# Patient Record
Sex: FEMALE | Race: WHITE | NOT HISPANIC OR LATINO | Employment: OTHER | URBAN - METROPOLITAN AREA
[De-identification: names, ages, dates, MRNs, and addresses within clinical notes are randomized per-mention and may not be internally consistent; named-entity substitution may affect disease eponyms.]

---

## 2018-04-02 ENCOUNTER — OFFICE VISIT (OUTPATIENT)
Dept: URGENT CARE | Facility: MEDICAL CENTER | Age: 28
End: 2018-04-02
Payer: COMMERCIAL

## 2018-04-02 VITALS
BODY MASS INDEX: 23.95 KG/M2 | RESPIRATION RATE: 16 BRPM | WEIGHT: 158 LBS | HEIGHT: 68 IN | DIASTOLIC BLOOD PRESSURE: 83 MMHG | SYSTOLIC BLOOD PRESSURE: 126 MMHG | TEMPERATURE: 99 F | HEART RATE: 79 BPM

## 2018-04-02 DIAGNOSIS — K08.89 PAIN, DENTAL: Primary | ICD-10-CM

## 2018-04-02 PROCEDURE — 99203 OFFICE O/P NEW LOW 30 MIN: CPT | Performed by: PHYSICIAN ASSISTANT

## 2018-04-02 RX ORDER — CLINDAMYCIN HYDROCHLORIDE 300 MG/1
300 CAPSULE ORAL 3 TIMES DAILY
Qty: 21 CAPSULE | Refills: 0 | Status: SHIPPED | OUTPATIENT
Start: 2018-04-02 | End: 2018-04-09

## 2018-04-03 NOTE — PATIENT INSTRUCTIONS
Take antibiotic as directed  Eat yogurt to avoid GI upset  Take motrin as directed for pain  Warm compress as directed  Pt has appt with dentist in one week  Dental Abscess   WHAT YOU NEED TO KNOW:   A dental abscess is a collection of pus in or around a tooth  A dental abscess is caused by bacteria  The bacteria usually enter the tooth when the enamel (outer part of the tooth) is damaged by tooth decay  Bacteria may also enter the tooth through a break or chip in the tooth, or a cut in the gum  Food particles that are stuck between the teeth for a long time may also lead to an abscess  DISCHARGE INSTRUCTIONS:   Return to the emergency department if:   · You have severe pain  · You have trouble breathing because of pain or swelling  Contact your healthcare provider if:   · Your symptoms get worse, even after treatment  · Your mouth is bleeding  · You cannot eat or drink because of pain or swelling  · Your abscess returns  · You have an injury that causes a crack in your tooth  · You have questions or concerns about your condition or care  Medicines: You may  need any of the following:  · Antibiotics  help treat a bacterial infection  · NSAIDs , such as ibuprofen, help decrease swelling, pain, and fever  This medicine is available with or without a doctor's order  NSAIDs can cause stomach bleeding or kidney problems in certain people  If you take blood thinner medicine, always ask your healthcare provider if NSAIDs are safe for you  Always read the medicine label and follow directions  · Acetaminophen  decreases pain and fever  It is available without a doctor's order  Ask how much to take and how often to take it  Follow directions  Read the labels of all other medicines you are using to see if they also contain acetaminophen, or ask your doctor or pharmacist  Acetaminophen can cause liver damage if not taken correctly   Do not use more than 4 grams (4,000 milligrams) total of acetaminophen in one day  · Prescription pain medicine  may be given  Ask your healthcare provider how to take this medicine safely  Some prescription pain medicines contain acetaminophen  Do not take other medicines that contain acetaminophen without talking to your healthcare provider  Too much acetaminophen may cause liver damage  Prescription pain medicine may cause constipation  Ask your healthcare provider how to prevent or treat constipation  · Take your medicine as directed  Contact your healthcare provider if you think your medicine is not helping or if you have side effects  Tell him of her if you are allergic to any medicine  Keep a list of the medicines, vitamins, and herbs you take  Include the amounts, and when and why you take them  Bring the list or the pill bottles to follow-up visits  Carry your medicine list with you in case of an emergency  Self-care:   · Rinse your mouth every 2 hours with salt water  This will help keep the area clean  · Gently brush your teeth twice a day with a soft tooth brush  This will help keep the area clean  · Eat soft foods as directed  Soft foods may cause less pain  Examples include applesauce, yogurt, and cooked pasta  Ask your healthcare provider how long to follow this instruction  · Apply a warm compress to your tooth or gum  Use a cotton ball or gauze soaked in warm water  Remove the compress in 10 minutes or when it becomes cool  Repeat 3 times a day  Prevent another abscess:   · Brush your teeth at least 2 times a day with fluoride toothpaste  · Use dental floss to clean between your teeth at least once a day  · Rinse your mouth with water or mouthwash after meals and snacks  · Chew sugarless gum after meals and snacks  · Limit foods that are sticky and high in sugar such as raisons  Also limit drinks high in sugar, such as soda  · See your dentist every 6 months for dental cleanings and oral exams    Follow up with your healthcare provider in 24 hours: Your healthcare provider will need to check your teeth and gums  Write down your questions so you remember to ask them during your visits  © 2017 [address] Information is for End User's use only and may not be sold, redistributed or otherwise used for commercial purposes  All illustrations and images included in CareNotes® are the copyrighted property of A [employer] M [employer]  or Jorden Lopez  The above information is an  only  It is not intended as medical advice for individual conditions or treatments  Talk to your doctor, nurse or pharmacist before following any medical regimen to see if it is safe and effective for you

## 2018-04-03 NOTE — PROGRESS NOTES
[facility]        NAME: Carole Hamm is a 32 y o  female  : 1990    MRN: [id number]      Assessment and Plan   Pain, dental [K08 89]  1  Pain, dental  clindamycin (CLEOCIN) 300 MG capsule         Patient Instructions     Take antibiotic as directed  Eat yogurt to avoid GI upset  Take motrin as directed for pain  Warm compress as directed  Pt has appt with dentist in one week  Follow up with PCP in 3-5 days  Proceed to  ER if symptoms worsen  Chief Complaint     Chief Complaint   Patient presents with    Dental Pain     R upper jaw, started last week went to dentist but has appt for root canal next week  Pain worse         History of Present Illness       Dental Pain    This is a new problem  The current episode started in the past 7 days  The problem occurs constantly  The problem has been gradually worsening  The pain is at a severity of 5/10  The pain is moderate  Associated symptoms include facial pain and sinus pressure  Pertinent negatives include no difficulty swallowing, fever, oral bleeding or thermal sensitivity  She has tried acetaminophen for the symptoms  The treatment provided mild relief  Review of Systems   Review of Systems   Constitutional: Negative for chills and fever  HENT: Positive for dental problem and sinus pressure  Eyes: Negative  Respiratory: Negative for chest tightness, shortness of breath and wheezing  Cardiovascular: Negative for chest pain and palpitations  Musculoskeletal: Negative for back pain and joint swelling  Skin: Negative for rash           Current Medications       Current Outpatient Prescriptions:     clindamycin (CLEOCIN) 300 MG capsule, Take 1 capsule (300 mg total) by mouth 3 (three) times a day for 7 days, Disp: 21 capsule, Rfl: 0    Current Allergies     Allergies as of 2018 - Reviewed 2018   Allergen Reaction Noted    Benadryl [diphenhydramine] Hives 2018    Penicillins Hives 2018    Zithromax [azithromycin] Palpitations 04/02/2018            The following portions of the patient's history were reviewed and updated as appropriate: allergies, current medications, past family history, past medical history, past social history, past surgical history and problem list      Past Medical History:   Diagnosis Date    Asthma        Past Surgical History:   Procedure Laterality Date    DENTAL SURGERY         No family history on file  Medications have been verified  Objective   /83 (Patient Position: Sitting)   Pulse 79   Temp 99 °F (37 2 °C) (Temporal)   Resp 16   Ht 5' 8" (1 727 m)   Wt 71 7 kg (158 lb)   BMI 24 02 kg/m²        Physical Exam     Physical Exam   Constitutional: She is oriented to person, place, and time  She appears well-developed and well-nourished  HENT:   Mouth/Throat: Oropharynx is clear and moist and mucous membranes are normal  She does not have dentures  No oral lesions  Normal dentition  Dental abscesses present  No dental caries  Eyes: Pupils are equal, round, and reactive to light  Neck: Normal range of motion  Cardiovascular: Normal rate, regular rhythm and normal heart sounds  No murmur heard  Pulmonary/Chest: Effort normal and breath sounds normal  No respiratory distress  Neurological: She is alert and oriented to person, place, and time  Psychiatric: She has a normal mood and affect  Nursing note and vitals reviewed

## 2019-09-06 ENCOUNTER — OFFICE VISIT (OUTPATIENT)
Dept: OBGYN CLINIC | Facility: CLINIC | Age: 29
End: 2019-09-06
Payer: COMMERCIAL

## 2019-09-06 VITALS
HEIGHT: 68 IN | WEIGHT: 148 LBS | BODY MASS INDEX: 22.43 KG/M2 | SYSTOLIC BLOOD PRESSURE: 104 MMHG | RESPIRATION RATE: 14 BRPM | DIASTOLIC BLOOD PRESSURE: 68 MMHG

## 2019-09-06 DIAGNOSIS — Z30.41 ENCOUNTER FOR SURVEILLANCE OF CONTRACEPTIVE PILLS: ICD-10-CM

## 2019-09-06 DIAGNOSIS — N63.20 LEFT BREAST LUMP: ICD-10-CM

## 2019-09-06 DIAGNOSIS — Z01.419 ENCOUNTER FOR GYNECOLOGICAL EXAMINATION WITHOUT ABNORMAL FINDING: Primary | ICD-10-CM

## 2019-09-06 PROCEDURE — S0610 ANNUAL GYNECOLOGICAL EXAMINA: HCPCS | Performed by: NURSE PRACTITIONER

## 2019-09-06 RX ORDER — NORETHINDRONE ACETATE AND ETHINYL ESTRADIOL 1; .02 MG/1; MG/1
1 TABLET ORAL DAILY
COMMUNITY
Start: 2019-07-27

## 2019-09-06 NOTE — PATIENT INSTRUCTIONS
Pap Smear   GENERAL INFORMATION:   What is a Pap smear? A Pap smear, or Pap test, is a procedure to check your cervix for abnormal cells  The cervix is the narrow opening at the bottom of your uterus  The cervix meets the top part of the vagina  How do I prepare for a Pap smear? The best time to schedule the test is right after your period stops  Do not have a Pap smear during your monthly period  Do not have intercourse or put anything in your vagina for 24 hours before your test    What will happen during a Pap smear? · You will lie on your back and place your feet on footrests called stirrups  Your caregiver will gently insert a device called a speculum into your vagina  The speculum is used to spread the walls of your vagina so he can see your cervix  He will use a thin brush or cotton swab to collect cells from the inside of your cervix  · Your caregiver will also collect cells from the surface of your cervix with a plastic or wooden tool called a spatula  He may also gently scrape the upper part of your vagina for a sample  The samples are placed in a container with liquid or on a glass slide  They are sent to a lab and examined for abnormal cells  How often do I need a Pap smear? Pap smears are usually done every 1 to 3 years  You may need a Pap smear more often if you have any of the following:  · Positive test result for the human papillomavirus (HPV)    · Cervical intraepithelial neoplasm or cervical cancer    · HIV    · A weak immune system    · Exposure to diethylstilbestrol (WOLF) medicine when your mother was pregnant with you  CARE AGREEMENT:   You have the right to help plan your care  Learn about your health condition and how it may be treated  Discuss treatment options with your caregivers to decide what care you want to receive  You always have the right to refuse treatment  The above information is an  only   It is not intended as medical advice for individual conditions or treatments  Talk to your doctor, nurse or pharmacist before following any medical regimen to see if it is safe and effective for you  © 2014 [address] is for End User's use only and may not be sold, redistributed or otherwise used for commercial purposes  All illustrations and images included in CareNotes® are the copyrighted property of A D A M [employer]  or Jorden Lopez

## 2019-09-06 NOTE — PROGRESS NOTES
Diagnoses and all orders for this visit:    1  Encounter for gynecological examination without abnormal finding  Pap collected today, discussed new guidelines  Healthy eating and nutrition, daily exercise discussed and SBE reinforced  Call with any issues and all questions and concerns addressed  2  Encounter for surveillance of contraceptive pills  She has RX for now through [facility], if needs additional RX pt instructed to call  We also discussed changing to POP if anxiety remains a problem with medication started  Patient instructed that when she is ready for surgical consult for Tubal, to call for appt  3  Left breast lump  -     Mammo diagnostic left w 3d & cad; Future  Possible fibroadenoma, overdue for F/U  Other orders  -     norethindrone-ethinyl estradiol (MICROGESTIN ) 1-20 MG-MCG per tablet; Take 1 tablet by mouth daily      All questions and concerns answered  Patient to call with any questions  Pleasant 34 y o  premenopausal female here for new patient annual exam  She is a , vaginal deliveries  She denies any issues with bleeding or her menses  Reports regular cycles  Denies history of abnormal pap smears  Last Pap , no pap today  She states that she was going to complete left breast ultrasound for a breast lump and was not able to because she was breast feeding  She needs diagnostic mammo and US order  Denies vaginal or urinary today  Denies pelvic pain  Denies any issues with her BCM, which is OCP, she gets from [facility] site  She does feels some exacerbation of her anxiety with the OCP but she just started therapy and will have appt with PCP next week to start medication  She noticed her anxiety was still there even after stopping birth control for a short period of time  So she knows its underlying  Sexually active without any concerns and pt declines STD testing  Denies F/C/N/V      Past Medical History:   Diagnosis Date    Asthma Past Surgical History:   Procedure Laterality Date    DENTAL SURGERY       History reviewed  No pertinent family history  Social History     Tobacco Use    Smoking status: Former Smoker    Smokeless tobacco: Never Used   Substance Use Topics    Alcohol use: Yes     Comment: social    Drug use: Never       Current Outpatient Medications:     norethindrone-ethinyl estradiol (MICROGESTIN 1/20) 1-20 MG-MCG per tablet, Take 1 tablet by mouth daily, Disp: , Rfl:   Patient Active Problem List    Diagnosis Date Noted    Encounter for gynecological examination without abnormal finding 09/06/2019    Encounter for surveillance of contraceptive pills 09/06/2019    Left breast lump 09/06/2019       Allergies   Allergen Reactions    Benadryl [Diphenhydramine] Hives    Penicillins Hives    Zithromax [Azithromycin] Palpitations       OB History   No data available       Vitals:    09/06/19 0901   BP: 104/68   BP Location: Left arm   Patient Position: Sitting   Cuff Size: Standard   Resp: 14   Weight: 67 1 kg (148 lb)   Height: 5' 8" (1 727 m)     Body mass index is 22 5 kg/m²  Review of Systems   Constitutional: Negative for chills, fatigue, fever and unexpected weight change  Respiratory: Negative for shortness of breath  Gastrointestinal: Negative for anal bleeding, blood in stool, constipation and diarrhea  Genitourinary: Negative for difficulty urinating, dysuria and hematuria  OBGyn Exam    Physical Exam   Constitutional: She appears well-developed and well-nourished  No distress  Head: Normocephalic  Neck: Normal range of motion  Neck supple  Pulmonary: Effort normal   Breasts: Rt breast without masses, skin changes or nipple discharge  Bilaterally soft and warm to touch  No areas of erythema or pain  Left BREAST mass felt  1 o'clock, 3 cm, firm, mobile, non-tender  Abdominal: Soft  Non-tender  Pelvic exam was performed with patient supine  No labial fusion   There is no rash, tenderness, lesion or injury on the right labia  There is no rash, tenderness, lesion or injury on the left labia  Uterus is not deviated, not enlarged, not fixed and not tender  Cervix exhibits no motion tenderness, no discharge and no friability  Right adnexum displays no mass, no tenderness and no fullness  Left adnexum displays no mass, no tenderness and no fullness  No erythema or tenderness in the vagina  No foreign body in the vagina  No signs of injury around the vagina  Small amount of thick, white  vaginal discharge found  Lymphadenopathy:        Right: No inguinal adenopathy present          Left: No inguinal adenopathy present

## 2020-10-24 ENCOUNTER — TRANSCRIBE ORDERS (OUTPATIENT)
Dept: ADMINISTRATIVE | Facility: HOSPITAL | Age: 30
End: 2020-10-24

## 2020-10-24 ENCOUNTER — LAB (OUTPATIENT)
Dept: LAB | Facility: CLINIC | Age: 30
End: 2020-10-24
Payer: COMMERCIAL

## 2020-10-24 DIAGNOSIS — E55.9 AVITAMINOSIS D: ICD-10-CM

## 2020-10-24 DIAGNOSIS — M19.90 SENILE ARTHRITIS: ICD-10-CM

## 2020-10-24 DIAGNOSIS — D50.9 IRON DEFICIENCY ANEMIA, UNSPECIFIED IRON DEFICIENCY ANEMIA TYPE: Primary | ICD-10-CM

## 2020-10-24 DIAGNOSIS — D50.9 IRON DEFICIENCY ANEMIA, UNSPECIFIED IRON DEFICIENCY ANEMIA TYPE: ICD-10-CM

## 2020-10-24 DIAGNOSIS — I51.9 MYXEDEMA HEART DISEASE: ICD-10-CM

## 2020-10-24 DIAGNOSIS — E03.9 MYXEDEMA HEART DISEASE: ICD-10-CM

## 2020-10-24 LAB
25(OH)D3 SERPL-MCNC: 8.8 NG/ML (ref 30–100)
ALBUMIN SERPL BCP-MCNC: 4.1 G/DL (ref 3.5–5)
ALP SERPL-CCNC: 64 U/L (ref 46–116)
ALT SERPL W P-5'-P-CCNC: 29 U/L (ref 12–78)
ANION GAP SERPL CALCULATED.3IONS-SCNC: 4 MMOL/L (ref 4–13)
AST SERPL W P-5'-P-CCNC: 14 U/L (ref 5–45)
BASOPHILS # BLD AUTO: 0.06 THOUSANDS/ΜL (ref 0–0.1)
BASOPHILS NFR BLD AUTO: 1 % (ref 0–1)
BILIRUB SERPL-MCNC: 1.41 MG/DL (ref 0.2–1)
BUN SERPL-MCNC: 10 MG/DL (ref 5–25)
CALCIUM SERPL-MCNC: 8.7 MG/DL (ref 8.3–10.1)
CHLORIDE SERPL-SCNC: 106 MMOL/L (ref 100–108)
CHOLEST SERPL-MCNC: 205 MG/DL (ref 50–200)
CO2 SERPL-SCNC: 28 MMOL/L (ref 21–32)
CREAT SERPL-MCNC: 0.85 MG/DL (ref 0.6–1.3)
EOSINOPHIL # BLD AUTO: 0.13 THOUSAND/ΜL (ref 0–0.61)
EOSINOPHIL NFR BLD AUTO: 2 % (ref 0–6)
ERYTHROCYTE [DISTWIDTH] IN BLOOD BY AUTOMATED COUNT: 11.8 % (ref 11.6–15.1)
GFR SERPL CREATININE-BSD FRML MDRD: 92 ML/MIN/1.73SQ M
GLUCOSE P FAST SERPL-MCNC: 84 MG/DL (ref 65–99)
HCT VFR BLD AUTO: 42.4 % (ref 34.8–46.1)
HDLC SERPL-MCNC: 51 MG/DL
HGB BLD-MCNC: 13.6 G/DL (ref 11.5–15.4)
IMM GRANULOCYTES # BLD AUTO: 0.02 THOUSAND/UL (ref 0–0.2)
IMM GRANULOCYTES NFR BLD AUTO: 0 % (ref 0–2)
LDLC SERPL CALC-MCNC: 122 MG/DL (ref 0–100)
LYMPHOCYTES # BLD AUTO: 2.12 THOUSANDS/ΜL (ref 0.6–4.47)
LYMPHOCYTES NFR BLD AUTO: 33 % (ref 14–44)
MCH RBC QN AUTO: 28.9 PG (ref 26.8–34.3)
MCHC RBC AUTO-ENTMCNC: 32.1 G/DL (ref 31.4–37.4)
MCV RBC AUTO: 90 FL (ref 82–98)
MONOCYTES # BLD AUTO: 0.41 THOUSAND/ΜL (ref 0.17–1.22)
MONOCYTES NFR BLD AUTO: 6 % (ref 4–12)
NEUTROPHILS # BLD AUTO: 3.69 THOUSANDS/ΜL (ref 1.85–7.62)
NEUTS SEG NFR BLD AUTO: 58 % (ref 43–75)
NONHDLC SERPL-MCNC: 154 MG/DL
NRBC BLD AUTO-RTO: 0 /100 WBCS
PLATELET # BLD AUTO: 201 THOUSANDS/UL (ref 149–390)
PMV BLD AUTO: 12 FL (ref 8.9–12.7)
POTASSIUM SERPL-SCNC: 4 MMOL/L (ref 3.5–5.3)
PROT SERPL-MCNC: 7.5 G/DL (ref 6.4–8.2)
RBC # BLD AUTO: 4.71 MILLION/UL (ref 3.81–5.12)
SODIUM SERPL-SCNC: 138 MMOL/L (ref 136–145)
TRIGL SERPL-MCNC: 158 MG/DL
TSH SERPL DL<=0.05 MIU/L-ACNC: 2.38 UIU/ML (ref 0.36–3.74)
WBC # BLD AUTO: 6.43 THOUSAND/UL (ref 4.31–10.16)

## 2020-10-24 PROCEDURE — 84443 ASSAY THYROID STIM HORMONE: CPT

## 2020-10-24 PROCEDURE — 86618 LYME DISEASE ANTIBODY: CPT

## 2020-10-24 PROCEDURE — 80053 COMPREHEN METABOLIC PANEL: CPT

## 2020-10-24 PROCEDURE — 86003 ALLG SPEC IGE CRUDE XTRC EA: CPT

## 2020-10-24 PROCEDURE — 85025 COMPLETE CBC W/AUTO DIFF WBC: CPT

## 2020-10-24 PROCEDURE — 83516 IMMUNOASSAY NONANTIBODY: CPT

## 2020-10-24 PROCEDURE — 82306 VITAMIN D 25 HYDROXY: CPT

## 2020-10-24 PROCEDURE — 36415 COLL VENOUS BLD VENIPUNCTURE: CPT

## 2020-10-24 PROCEDURE — 80061 LIPID PANEL: CPT

## 2020-10-27 LAB — B BURGDOR IGG+IGM SER-ACNC: 24

## 2020-10-29 LAB
GLIADIN IGG SER IA-ACNC: 11 UNITS (ref 0–19)
GLIADIN PEPTIDE+TTG IGA+IGG SER QL IA: NEGATIVE
Lab: NORMAL
NOTE: NORMAL
WHEAT IGE QN: <0.1 KU/L

## 2020-12-28 ENCOUNTER — NURSE TRIAGE (OUTPATIENT)
Dept: OTHER | Facility: OTHER | Age: 30
End: 2020-12-28

## 2021-08-02 ENCOUNTER — CLINICAL SUPPORT (OUTPATIENT)
Dept: URGENT CARE | Facility: CLINIC | Age: 31
End: 2021-08-02

## 2021-08-02 DIAGNOSIS — G43.909 MIGRAINE WITHOUT STATUS MIGRAINOSUS, NOT INTRACTABLE, UNSPECIFIED MIGRAINE TYPE: ICD-10-CM

## 2021-08-02 DIAGNOSIS — A69.20 LYME DISEASE: ICD-10-CM

## 2021-08-02 DIAGNOSIS — R07.89 CHEST DISCOMFORT: ICD-10-CM

## 2021-08-02 DIAGNOSIS — R01.1: ICD-10-CM

## 2021-08-02 DIAGNOSIS — K58.9 IRRITABLE BOWEL SYNDROME, UNSPECIFIED TYPE: ICD-10-CM

## 2022-05-10 ENCOUNTER — APPOINTMENT (OUTPATIENT)
Dept: LAB | Facility: CLINIC | Age: 32
End: 2022-05-10
Payer: COMMERCIAL

## 2022-05-10 DIAGNOSIS — E78.5 HYPERLIPIDEMIA, UNSPECIFIED HYPERLIPIDEMIA TYPE: ICD-10-CM

## 2022-05-10 DIAGNOSIS — E55.9 VITAMIN D DEFICIENCY DISEASE: ICD-10-CM

## 2022-05-10 DIAGNOSIS — R51.9 FACIAL PAIN: ICD-10-CM

## 2022-05-10 LAB
25(OH)D3 SERPL-MCNC: 14.6 NG/ML (ref 30–100)
ALBUMIN SERPL BCP-MCNC: 4 G/DL (ref 3.5–5)
ALP SERPL-CCNC: 59 U/L (ref 46–116)
ALT SERPL W P-5'-P-CCNC: 36 U/L (ref 12–78)
ANION GAP SERPL CALCULATED.3IONS-SCNC: 6 MMOL/L (ref 4–13)
AST SERPL W P-5'-P-CCNC: 23 U/L (ref 5–45)
BASOPHILS # BLD AUTO: 0.06 THOUSANDS/ΜL (ref 0–0.1)
BASOPHILS NFR BLD AUTO: 1 % (ref 0–1)
BILIRUB SERPL-MCNC: 1.28 MG/DL (ref 0.2–1)
BUN SERPL-MCNC: 10 MG/DL (ref 5–25)
CALCIUM SERPL-MCNC: 9.4 MG/DL (ref 8.3–10.1)
CHLORIDE SERPL-SCNC: 102 MMOL/L (ref 100–108)
CHOLEST SERPL-MCNC: 224 MG/DL
CO2 SERPL-SCNC: 29 MMOL/L (ref 21–32)
CREAT SERPL-MCNC: 0.77 MG/DL (ref 0.6–1.3)
EOSINOPHIL # BLD AUTO: 0.14 THOUSAND/ΜL (ref 0–0.61)
EOSINOPHIL NFR BLD AUTO: 2 % (ref 0–6)
ERYTHROCYTE [DISTWIDTH] IN BLOOD BY AUTOMATED COUNT: 12.4 % (ref 11.6–15.1)
ERYTHROCYTE [SEDIMENTATION RATE] IN BLOOD: 10 MM/HOUR (ref 0–19)
GFR SERPL CREATININE-BSD FRML MDRD: 102 ML/MIN/1.73SQ M
GLUCOSE P FAST SERPL-MCNC: 79 MG/DL (ref 65–99)
HCT VFR BLD AUTO: 42.1 % (ref 34.8–46.1)
HDLC SERPL-MCNC: 49 MG/DL
HGB BLD-MCNC: 13.5 G/DL (ref 11.5–15.4)
IMM GRANULOCYTES # BLD AUTO: 0.04 THOUSAND/UL (ref 0–0.2)
IMM GRANULOCYTES NFR BLD AUTO: 1 % (ref 0–2)
LDLC SERPL CALC-MCNC: 137 MG/DL (ref 0–100)
LYMPHOCYTES # BLD AUTO: 2.66 THOUSANDS/ΜL (ref 0.6–4.47)
LYMPHOCYTES NFR BLD AUTO: 36 % (ref 14–44)
MCH RBC QN AUTO: 28.2 PG (ref 26.8–34.3)
MCHC RBC AUTO-ENTMCNC: 32.1 G/DL (ref 31.4–37.4)
MCV RBC AUTO: 88 FL (ref 82–98)
MONOCYTES # BLD AUTO: 0.47 THOUSAND/ΜL (ref 0.17–1.22)
MONOCYTES NFR BLD AUTO: 6 % (ref 4–12)
NEUTROPHILS # BLD AUTO: 4.07 THOUSANDS/ΜL (ref 1.85–7.62)
NEUTS SEG NFR BLD AUTO: 54 % (ref 43–75)
NONHDLC SERPL-MCNC: 175 MG/DL
NRBC BLD AUTO-RTO: 0 /100 WBCS
PLATELET # BLD AUTO: 210 THOUSANDS/UL (ref 149–390)
PMV BLD AUTO: 12.4 FL (ref 8.9–12.7)
POTASSIUM SERPL-SCNC: 4.1 MMOL/L (ref 3.5–5.3)
PROT SERPL-MCNC: 7.6 G/DL (ref 6.4–8.2)
RBC # BLD AUTO: 4.78 MILLION/UL (ref 3.81–5.12)
SODIUM SERPL-SCNC: 137 MMOL/L (ref 136–145)
T3FREE SERPL-MCNC: 3.1 PG/ML (ref 2.3–4.2)
T4 FREE SERPL-MCNC: 0.91 NG/DL (ref 0.76–1.46)
TRIGL SERPL-MCNC: 188 MG/DL
TSH SERPL DL<=0.05 MIU/L-ACNC: 1.95 UIU/ML (ref 0.45–4.5)
WBC # BLD AUTO: 7.44 THOUSAND/UL (ref 4.31–10.16)

## 2022-05-10 PROCEDURE — 80061 LIPID PANEL: CPT

## 2022-05-10 PROCEDURE — 85025 COMPLETE CBC W/AUTO DIFF WBC: CPT

## 2022-05-10 PROCEDURE — 84443 ASSAY THYROID STIM HORMONE: CPT

## 2022-05-10 PROCEDURE — 84481 FREE ASSAY (FT-3): CPT

## 2022-05-10 PROCEDURE — 36415 COLL VENOUS BLD VENIPUNCTURE: CPT

## 2022-05-10 PROCEDURE — 84439 ASSAY OF FREE THYROXINE: CPT

## 2022-05-10 PROCEDURE — 82306 VITAMIN D 25 HYDROXY: CPT

## 2022-05-10 PROCEDURE — 85652 RBC SED RATE AUTOMATED: CPT

## 2022-05-10 PROCEDURE — 80053 COMPREHEN METABOLIC PANEL: CPT

## 2022-11-01 ENCOUNTER — HOSPITAL ENCOUNTER (OUTPATIENT)
Dept: MRI IMAGING | Facility: HOSPITAL | Age: 32
Discharge: HOME | End: 2022-11-01

## 2022-11-01 DIAGNOSIS — R51.9 NONINTRACTABLE HEADACHE, UNSPECIFIED CHRONICITY PATTERN, UNSPECIFIED HEADACHE TYPE: ICD-10-CM

## 2022-11-01 RX ADMIN — GADOBUTROL 8 ML: 604.72 INJECTION INTRAVENOUS at 06:47

## 2023-08-15 ENCOUNTER — OFFICE VISIT (OUTPATIENT)
Dept: URGENT CARE | Facility: CLINIC | Age: 33
End: 2023-08-15
Payer: COMMERCIAL

## 2023-08-15 VITALS
OXYGEN SATURATION: 98 % | RESPIRATION RATE: 18 BRPM | SYSTOLIC BLOOD PRESSURE: 137 MMHG | DIASTOLIC BLOOD PRESSURE: 90 MMHG | HEART RATE: 72 BPM | TEMPERATURE: 97.7 F

## 2023-08-15 DIAGNOSIS — H66.93 BILATERAL OTITIS MEDIA, UNSPECIFIED OTITIS MEDIA TYPE: Primary | ICD-10-CM

## 2023-08-15 DIAGNOSIS — J02.9 SORE THROAT: ICD-10-CM

## 2023-08-15 LAB — S PYO AG THROAT QL: NEGATIVE

## 2023-08-15 PROCEDURE — 87880 STREP A ASSAY W/OPTIC: CPT | Performed by: PHYSICIAN ASSISTANT

## 2023-08-15 PROCEDURE — 99213 OFFICE O/P EST LOW 20 MIN: CPT | Performed by: PHYSICIAN ASSISTANT

## 2023-08-15 RX ORDER — GABAPENTIN 100 MG/1
CAPSULE ORAL
COMMUNITY
Start: 2023-08-01

## 2023-08-15 RX ORDER — ESCITALOPRAM OXALATE 10 MG/1
TABLET ORAL
COMMUNITY

## 2023-08-15 RX ORDER — PROPRANOLOL HYDROCHLORIDE 10 MG/1
10 TABLET ORAL 3 TIMES DAILY
COMMUNITY
Start: 2023-07-26

## 2023-08-15 RX ORDER — DOXYCYCLINE 100 MG/1
100 TABLET ORAL 2 TIMES DAILY
Qty: 14 TABLET | Refills: 0 | Status: SHIPPED | OUTPATIENT
Start: 2023-08-15 | End: 2023-08-22

## 2023-08-15 NOTE — PROGRESS NOTES
[facility]        NAME: Gissel Mac is a 35 y.o. female  : 1990    MRN: [id number]  DATE: August 15, 2023  TIME: 4:58 PM    Assessment and Plan   Bilateral otitis media, unspecified otitis media type [H66.93]  1. Bilateral otitis media, unspecified otitis media type  doxycycline (ADOXA) 100 MG tablet      2. Sore throat  POCT rapid strepA        POCT Strep negative   Take Doxycyline for AOM  Tylenol/IBP for pain     Patient Instructions       Follow up with PCP in 3-5 days. Proceed to  ER if symptoms worsen. Chief Complaint     Chief Complaint   Patient presents with   • Sore Throat     C/o sensation of swollen glands, sinus h/a, ears clogged, and sore throat x 3 days. Taking allergy medication, choroseptic spray          History of Present Illness       Patient is a 34 yo female who presents for evaluation of sore throat and bilateral ear pain x 3 days. She has been taking allergy meds and using choloroseptic spray. Denies fevers, body aches, abdominal pain, n/v.       Review of Systems   Review of Systems   Constitutional: Negative for chills and fever. HENT: Positive for ear pain and sore throat. Negative for trouble swallowing. Gastrointestinal: Negative for abdominal pain, nausea and vomiting. Musculoskeletal: Negative for arthralgias and myalgias. Neurological: Positive for headaches. Current Medications       Current Outpatient Medications:   •  doxycycline (ADOXA) 100 MG tablet, Take 1 tablet (100 mg total) by mouth 2 (two) times a day for 7 days, Disp: 14 tablet, Rfl: 0  •  escitalopram (Lexapro) 10 mg tablet, , Disp: , Rfl:   •  gabapentin (NEURONTIN) 100 mg capsule, take 2 capsules by mouth every morning and 2 capsules by mouth ev...  (REFER TO PRESCRIPTION NOTES). , Disp: , Rfl:   •  propranolol (INDERAL) 10 mg tablet, Take 10 mg by mouth 3 (three) times a day, Disp: , Rfl:   •  norethindrone-ethinyl estradiol (MICROGESTIN ) 1-20 MG-MCG per tablet, Take 1 tablet by mouth daily, Disp: , Rfl:     Current Allergies     Allergies as of 08/15/2023 - Reviewed 08/15/2023   Allergen Reaction Noted   • Benadryl [diphenhydramine] Hives 04/02/2018   • Penicillins Hives 04/02/2018   • Topiramate Anxiety 10/27/2022   • Zithromax [azithromycin] Palpitations 04/02/2018            The following portions of the patient's history were reviewed and updated as appropriate: allergies, current medications, past family history, past medical history, past social history, past surgical history and problem list.     Past Medical History:   Diagnosis Date   • Asthma        Past Surgical History:   Procedure Laterality Date   • DENTAL SURGERY         History reviewed. No pertinent family history. Medications have been verified. Objective   /90   Pulse 72   Temp 97.7 °F (36.5 °C)   Resp 18   SpO2 98%        Physical Exam     Physical Exam  Constitutional:       General: She is not in acute distress. Appearance: She is not toxic-appearing. HENT:      Ears:      Comments: B/l TMs erythematous and swollen      Mouth/Throat:      Comments: Minimal erythema. No edema or tonsillar enlargement   Cardiovascular:      Rate and Rhythm: Normal rate. Heart sounds: Normal heart sounds. Pulmonary:      Effort: Pulmonary effort is normal.      Breath sounds: Normal breath sounds. Skin:     General: Skin is warm and dry. Neurological:      Mental Status: She is alert.

## 2024-02-21 PROBLEM — Z01.419 ENCOUNTER FOR GYNECOLOGICAL EXAMINATION WITHOUT ABNORMAL FINDING: Status: RESOLVED | Noted: 2019-09-06 | Resolved: 2024-02-21

## 2024-03-13 ENCOUNTER — APPOINTMENT (OUTPATIENT)
Dept: RADIOLOGY | Facility: CLINIC | Age: 34
End: 2024-03-13
Payer: COMMERCIAL

## 2024-03-13 DIAGNOSIS — M25.551 RIGHT HIP PAIN: ICD-10-CM

## 2024-03-13 PROCEDURE — 73502 X-RAY EXAM HIP UNI 2-3 VIEWS: CPT

## 2024-04-03 ENCOUNTER — OFFICE VISIT (OUTPATIENT)
Dept: OBGYN CLINIC | Facility: CLINIC | Age: 34
End: 2024-04-03
Payer: COMMERCIAL

## 2024-04-03 ENCOUNTER — APPOINTMENT (OUTPATIENT)
Dept: RADIOLOGY | Facility: CLINIC | Age: 34
End: 2024-04-03
Payer: COMMERCIAL

## 2024-04-03 VITALS
SYSTOLIC BLOOD PRESSURE: 112 MMHG | BODY MASS INDEX: 28.79 KG/M2 | HEIGHT: 68 IN | DIASTOLIC BLOOD PRESSURE: 82 MMHG | HEART RATE: 101 BPM | WEIGHT: 190 LBS

## 2024-04-03 DIAGNOSIS — M25.551 PAIN IN RIGHT HIP: ICD-10-CM

## 2024-04-03 DIAGNOSIS — M54.16 RADICULOPATHY, LUMBAR REGION: ICD-10-CM

## 2024-04-03 DIAGNOSIS — M54.16 RADICULOPATHY, LUMBAR REGION: Primary | ICD-10-CM

## 2024-04-03 PROCEDURE — 99203 OFFICE O/P NEW LOW 30 MIN: CPT | Performed by: FAMILY MEDICINE

## 2024-04-03 PROCEDURE — 72100 X-RAY EXAM L-S SPINE 2/3 VWS: CPT

## 2024-04-03 RX ORDER — ALPRAZOLAM 0.5 MG/1
TABLET ORAL
COMMUNITY

## 2024-04-03 NOTE — PROGRESS NOTES
Assessment/Plan:  Assessment/Plan   Diagnoses and all orders for this visit:    Radiculopathy, lumbar region  -     XR spine lumbar 2 or 3 views injury; Future  -     MRI lumbar spine wo contrast; Future    Pain in right hip  -     MRI hip right wo contrast; Future    Other orders  -     ALPRAZolam (Xanax) 0.5 mg tablet        33-year-old female with low back and right hip pain more than 10 years duration.  Discussed with patient physical exam, radiographs, impression, and plan.  X-rays of the right hip are unremarkable for osseous abnormality or severe degenerative changes.  X-rays lumbar spine unremarkable for osseous abnormality or significant degenerative changes.  There is dextrocurvature of lumbar spine.  Physical exam lumbar spine noted for midline tenderness L4-S1 and paraspinal and bilateral SI joint tenderness.  She is limited range of motion rotating and sidebending to both sides.  She has reproduction of anterior hip/groin pain on both sides with FADDIR maneuvers, more pronounced on the right.  Straight leg raise is unremarkable bilaterally.  She has normal sensation and patellar reflex both lower extremities.  Clinical impression is that she has symptoms from combination of lumbar spine and right hip pathologies.  Since symptoms are worsening despite conservative management of more than 6 months of chiropractic, alternate with ice and heat, Tylenol and NSAIDs.  At this time I will refer her for MRI of the lumbar spine to evaluate for disc pathology, stenosis, nerve impingement and I will refer for MRI of the right hip to evaluate for intra-articular pathology and impingement as invasive management may be warranted.  She will return after having MRIs done.      Subjective:   Patient ID: Chyna Pal is a 33 y.o. female.  Chief Complaint   Patient presents with    Right Hip - Pain    Lower Back - Pain        33-year-old female presents for evaluation of low back and right hip pain of more than 10 years  "duration.  She denies trauma or inciting event.  She reports having pain described as localized to the lumbosacral aspect of spine but worse on the right, radiating distally to the right anterior hip/groin and distally to the right lower leg, aching burning and sometimes sharp, worse with bending twisting, worse with bearing weight and ambulating, associated popping/clicking of the hip, and improved with resting.  She was treated in the past with seeing chiropractor for more than 6 months, of which most recent visit was approximate 1 year ago.  Since then she has continued with stretching exercises on her own.  She manages symptoms with heating pad daily and alternating with Tylenol and ibuprofen.  She reports some bladder incontinence with coughing/sneezing since pregnancy.    Back Pain  This is a chronic problem. The current episode started more than 1 year ago. The problem occurs daily. The problem has been gradually worsening. Associated symptoms include arthralgias and weakness. Pertinent negatives include no joint swelling or numbness. The symptoms are aggravated by standing, walking, twisting and bending. She has tried rest, position changes, NSAIDs, ice, heat and acetaminophen (Chiropractic, home exercise) for the symptoms. The treatment provided mild relief.           The following portions of the patient's history were reviewed and updated as appropriate: She  has a past medical history of Asthma.  She is allergic to benadryl [diphenhydramine], penicillins, topiramate, and zithromax [azithromycin]..    Review of Systems   Musculoskeletal:  Positive for arthralgias and back pain. Negative for joint swelling.   Neurological:  Positive for weakness. Negative for numbness.       Objective:  Vitals:    04/03/24 0806   BP: 112/82   Pulse: 101   Weight: 86.2 kg (190 lb)   Height: 5' 8\" (1.727 m)      Right Ankle Exam     Muscle Strength   Dorsiflexion:  5/5  Plantar flexion:  5/5       Left Ankle Exam     Muscle " Strength   Dorsiflexion:  5/5   Plantar flexion:  5/5       Right Hip Exam     Tenderness   The patient is experiencing no tenderness.     Muscle Strength   Flexion: 5/5     Tests   JUANPABLO: negative    Comments:  Positive FADDIR      Left Hip Exam     Muscle Strength   Flexion: 5/5     Tests   JUANPABLO: negative    Comments:  Positive FADDIR      Back Exam     Tenderness   The patient is experiencing tenderness in the lumbar and sacroiliac.    Range of Motion   Extension:  normal   Flexion:  normal   Lateral bend right:  abnormal   Lateral bend left:  abnormal   Rotation right:  abnormal   Rotation left:  abnormal     Muscle Strength   Right Quadriceps:  5/5   Left Quadriceps:  5/5     Tests   Straight leg raise right: negative  Straight leg raise left: negative    Reflexes   Patellar:  normal    Other   Sensation: normal  Gait: normal           Strength/Myotome Testing     Left Ankle/Foot   Dorsiflexion: 5  Plantar flexion: 5    Right Ankle/Foot   Dorsiflexion: 5  Plantar flexion: 5      Physical Exam  Vitals and nursing note reviewed.   Constitutional:       Appearance: Normal appearance. She is well-developed. She is not ill-appearing or diaphoretic.   HENT:      Head: Normocephalic and atraumatic.      Right Ear: External ear normal.      Left Ear: External ear normal.   Eyes:      Conjunctiva/sclera: Conjunctivae normal.   Neck:      Trachea: No tracheal deviation.   Cardiovascular:      Rate and Rhythm: Normal rate.   Pulmonary:      Effort: Pulmonary effort is normal. No respiratory distress.   Abdominal:      General: There is no distension.   Musculoskeletal:         General: Tenderness present. No swelling, deformity or signs of injury.      Lumbar back: Negative right straight leg raise test and negative left straight leg raise test.   Skin:     General: Skin is warm and dry.      Coloration: Skin is not jaundiced or pale.   Neurological:      Mental Status: She is alert and oriented to person, place, and  time.   Psychiatric:         Mood and Affect: Mood normal.         Behavior: Behavior normal.         Thought Content: Thought content normal.         Judgment: Judgment normal.         I have personally reviewed pertinent films in PACS and my interpretation is  .  X-rays of the right hip are unremarkable for osseous abnormality or severe degenerative changes.  X-rays lumbar spine unremarkable for osseous abnormality or significant degenerative changes.  There is dextrocurvature of lumbar spine.

## 2024-04-04 ENCOUNTER — TELEPHONE (OUTPATIENT)
Age: 34
End: 2024-04-04

## 2024-04-04 NOTE — TELEPHONE ENCOUNTER
Caller: [facility]     Doctor: Ehsan     Reason for call: Asking for CPT codes for MRIs    Call back#:

## 2024-04-04 NOTE — TELEPHONE ENCOUNTER
Caller: Vida     Doctor/Office    Call regarding :  MRI Procedure codes     Call was transferred to: central scheduling

## 2024-04-10 ENCOUNTER — HOSPITAL ENCOUNTER (OUTPATIENT)
Dept: MRI IMAGING | Facility: HOSPITAL | Age: 34
Discharge: HOME | End: 2024-04-10
Attending: FAMILY MEDICINE
Payer: COMMERCIAL

## 2024-04-10 DIAGNOSIS — M25.551 PAIN IN RIGHT HIP: ICD-10-CM

## 2024-04-10 DIAGNOSIS — M54.16 RADICULOPATHY, LUMBAR REGION: ICD-10-CM

## 2024-04-10 PROCEDURE — 72148 MRI LUMBAR SPINE W/O DYE: CPT

## 2024-04-10 PROCEDURE — 73721 MRI JNT OF LWR EXTRE W/O DYE: CPT

## 2024-05-01 ENCOUNTER — OFFICE VISIT (OUTPATIENT)
Dept: OBGYN CLINIC | Facility: CLINIC | Age: 34
End: 2024-05-01
Payer: COMMERCIAL

## 2024-05-01 VITALS
BODY MASS INDEX: 28.79 KG/M2 | HEART RATE: 87 BPM | HEIGHT: 68 IN | WEIGHT: 190 LBS | SYSTOLIC BLOOD PRESSURE: 118 MMHG | DIASTOLIC BLOOD PRESSURE: 89 MMHG

## 2024-05-01 DIAGNOSIS — M47.816 LUMBAR FACET ARTHROPATHY: ICD-10-CM

## 2024-05-01 DIAGNOSIS — M48.00 CENTRAL SPINAL STENOSIS: ICD-10-CM

## 2024-05-01 DIAGNOSIS — M51.26 PROTRUSION OF LUMBAR INTERVERTEBRAL DISC: Primary | ICD-10-CM

## 2024-05-01 DIAGNOSIS — M25.851 FEMOROACETABULAR IMPINGEMENT OF RIGHT HIP: ICD-10-CM

## 2024-05-01 DIAGNOSIS — M51.36 BULGING LUMBAR DISC: ICD-10-CM

## 2024-05-01 PROCEDURE — 99214 OFFICE O/P EST MOD 30 MIN: CPT | Performed by: FAMILY MEDICINE

## 2024-05-01 RX ORDER — MELOXICAM 15 MG/1
15 TABLET ORAL DAILY
Qty: 30 TABLET | Refills: 1 | Status: SHIPPED | OUTPATIENT
Start: 2024-05-01

## 2024-05-01 NOTE — PATIENT INSTRUCTIONS
Over the counter vitamins:    - turmeric vitamin at least 1000 mg daily    - tart cherry vitamin at least 1000 mg daily    - glucosamine-chondrointin 2-3 times daily    Rx: Meloxicam 15 mg  - once daily  - eat first  - everyday  - 30 days  - no ibuprofen  - no aleve  - tylenol is ok    Aqua therapy    Spine and pain consult

## 2024-05-01 NOTE — PROGRESS NOTES
Assessment/Plan:  Assessment/Plan   Diagnoses and all orders for this visit:    Protrusion of lumbar intervertebral disc  -     Ambulatory referral to Spine & Pain Management; Future  -     Ambulatory Referral to Physical Therapy; Future    Lumbar facet arthropathy  -     Ambulatory referral to Spine & Pain Management; Future  -     Ambulatory Referral to Physical Therapy; Future  -     meloxicam (Mobic) 15 mg tablet; Take 1 tablet (15 mg total) by mouth daily    Central spinal stenosis  -     Ambulatory Referral to Physical Therapy; Future    Bulging lumbar disc  -     Ambulatory Referral to Physical Therapy; Future    Femoroacetabular impingement of right hip  -     Ambulatory Referral to Physical Therapy; Future        34-year-old female with low back and right hip pain of more than 10 years duration.  Discussed with patient MRI findings, impression, and plan.  MRI of the right hip noted for findings suggestive of cam type femoral acetabular impingement with slight complex deformity at the anterior margin of the right femoral head and neck junction.  MRI of the lumbar spine noted for multilevel disc bulging particularly L2-S1, multilevel foot arthropathy particularly L2-S1, L3-L4 mild canal stenosis, L4-L5 mild canal narrowing and mild right foraminal narrowing, L5-S1 central disc protrusion with annular fissure and mild canal narrowing.  Clinical impression is that she has symptoms from combination of lumbar spine degenerative changes and from abnormal hip morphology contributing to impingement.  Advised patient that surgery not warranted unless failed conservative management.  Regards to her hip and low back I will refer her to aqua therapy which she is to start as soon as possible and do home exercises as directed.  We will do trial of prescription NSAID.  She is to take meloxicam 15 mg once daily with food for 30 days and not to take ibuprofen or Aleve, but may take Tylenol.  She is to start taking turmeric  "vitamin at least 1000 mg daily, tart cherry vitamin at least 1000 mg daily, glucosamine 2-3 times daily.  I will also refer her to pain management for further evaluation and recommendation of treatment regarding lumbar spine.  She will follow-up with me after completing at least 6 weeks of formal therapy.            Subjective:   Patient ID: Chyna Pal is a 34 y.o. female.  Chief Complaint   Patient presents with    Right Hip - Follow-up    Lower Back - Follow-up        34-year-old female following up for low back and right hip pain more than 10 years duration.  She was last seen by me 4 weeks ago at which point she was referred for MRIs of the right hip and lumbar spine.  She denies changes in symptoms since her last visit.  She has pain described as localized to lumbosacral aspect of spine, rating distally to the right anterior/groin and distally to the right lower leg, aching burning and sometimes sharp, worse with bending and twisting, associated popping and clicking of the right hip, and improved with resting.  She Robbi been Tylenol, ibuprofen, and Aleve to help with symptoms.  Sometimes she experiences an intense sharp pain of the back and her back may lock up.    Hip Pain  This is a chronic problem. The current episode started more than 1 year ago. The problem occurs daily. The problem has been waxing and waning. Associated symptoms include arthralgias. Pertinent negatives include no joint swelling or numbness. The symptoms are aggravated by standing, walking and twisting. She has tried rest, position changes, NSAIDs and acetaminophen (Chiropractic) for the symptoms. The treatment provided mild relief.               Review of Systems   Musculoskeletal:  Positive for arthralgias. Negative for joint swelling.   Neurological:  Negative for numbness.       Objective:  Vitals:    05/01/24 1522   BP: 118/89   Pulse: 87   Weight: 86.2 kg (190 lb)   Height: 5' 8\" (1.727 m)      Back Exam     Other   Gait: normal "             Physical Exam  Vitals and nursing note reviewed.   Constitutional:       Appearance: Normal appearance. She is well-developed. She is not ill-appearing or diaphoretic.   HENT:      Head: Normocephalic and atraumatic.      Right Ear: External ear normal.      Left Ear: External ear normal.   Eyes:      Conjunctiva/sclera: Conjunctivae normal.   Neck:      Trachea: No tracheal deviation.   Cardiovascular:      Rate and Rhythm: Normal rate.   Pulmonary:      Effort: Pulmonary effort is normal. No respiratory distress.   Abdominal:      General: There is no distension.   Skin:     General: Skin is warm and dry.      Coloration: Skin is not jaundiced or pale.   Neurological:      Mental Status: She is alert and oriented to person, place, and time.   Psychiatric:         Mood and Affect: Mood normal.         Behavior: Behavior normal.         Thought Content: Thought content normal.         Judgment: Judgment normal.         I have personally reviewed pertinent films in PACS and my interpretation is  .  Lumbar spine multiple levels of bulging disks.  No stress fracture of the hip/pelvis.  Bony protuberance at the right femoral head neck junction suggestive of cam deformity.

## 2024-05-04 ENCOUNTER — OFFICE VISIT (OUTPATIENT)
Dept: URGENT CARE | Facility: CLINIC | Age: 34
End: 2024-05-04
Payer: COMMERCIAL

## 2024-05-04 VITALS
SYSTOLIC BLOOD PRESSURE: 118 MMHG | TEMPERATURE: 97.7 F | HEART RATE: 72 BPM | RESPIRATION RATE: 16 BRPM | DIASTOLIC BLOOD PRESSURE: 82 MMHG | OXYGEN SATURATION: 100 %

## 2024-05-04 DIAGNOSIS — J02.0 STREP PHARYNGITIS: Primary | ICD-10-CM

## 2024-05-04 LAB — S PYO AG THROAT QL: POSITIVE

## 2024-05-04 PROCEDURE — 87880 STREP A ASSAY W/OPTIC: CPT

## 2024-05-04 PROCEDURE — 99213 OFFICE O/P EST LOW 20 MIN: CPT

## 2024-05-04 RX ORDER — CEPHALEXIN 500 MG/1
500 CAPSULE ORAL EVERY 12 HOURS SCHEDULED
Qty: 20 CAPSULE | Refills: 0 | Status: SHIPPED | OUTPATIENT
Start: 2024-05-04 | End: 2024-05-14

## 2024-05-04 NOTE — PROGRESS NOTES
[facility]    NAME: Chyna Pal is a 34 y.o. female  : 1990    MRN: [id number]  DATE: May 4, 2024  TIME: 4:20 PM    Assessment and Plan   Strep pharyngitis [J02.0]  1. Strep pharyngitis  POCT rapid strepA    cephalexin (KEFLEX) 500 mg capsule        POC strep is positive, noted allergy to amoxicillin and azithromyxin. Will start on keflex for symptoms.   Given education on supportive measures for symptoms in discharge instructions.  Follow up with primary care in 3-5 days.  Go to ER if symptoms get worse.     Patient Instructions     --Take all of the antibiotics for symptoms.    --You should contact your primary care provider and/or go to the ER if your symptoms are not improved or get worse over the next 7 days. This includes new onset fever, localized ear pain, sinus pain, as well as worsening cough, chest pain, shortness of breath, or significant weakness/fatigue.      Chief Complaint     Chief Complaint   Patient presents with    Cold Like Symptoms     C/o ear pain, sore throat, h/a, and nasal congestion that stared last Sat. Taking Thera flu, Flonase no improvement          History of Present Illness       Presents with sick symptoms including left ear pain, sore throat, headache, and nasal congestion that stared over 1 week ago. Taking Thera flu, Flonase no improvement.         Review of Systems   Review of Systems   Constitutional:  Negative for chills and fever.   HENT:  Positive for ear pain and sore throat. Negative for congestion.    Respiratory:  Negative for cough and shortness of breath.    Cardiovascular:  Negative for chest pain.   Gastrointestinal:  Negative for abdominal pain.   Musculoskeletal:  Negative for myalgias.   Psychiatric/Behavioral:  Negative for confusion.          Current Medications       Current Outpatient Medications:     ALPRAZolam (Xanax) 0.5 mg tablet, , Disp: , Rfl:     cephalexin (KEFLEX) 500 mg capsule, Take 1 capsule (500 mg total) by mouth every 12  (twelve) hours for 10 days, Disp: 20 capsule, Rfl: 0    escitalopram (Lexapro) 10 mg tablet, , Disp: , Rfl:     gabapentin (NEURONTIN) 100 mg capsule, take 2 capsules by mouth every morning and 2 capsules by mouth ev...  (REFER TO PRESCRIPTION NOTES)., Disp: , Rfl:     meloxicam (Mobic) 15 mg tablet, Take 1 tablet (15 mg total) by mouth daily, Disp: 30 tablet, Rfl: 1    propranolol (INDERAL) 10 mg tablet, Take 10 mg by mouth 3 (three) times a day, Disp: , Rfl:     Current Allergies     Allergies as of 05/04/2024 - Reviewed 05/04/2024   Allergen Reaction Noted    Benadryl [diphenhydramine] Hives 04/02/2018    Penicillins Hives 04/02/2018    Topiramate Anxiety 10/27/2022    Zithromax [azithromycin] Palpitations 04/02/2018            The following portions of the patient's history were reviewed and updated as appropriate: allergies, current medications, past family history, past medical history, past social history, past surgical history and problem list.     Past Medical History:   Diagnosis Date    Asthma        Past Surgical History:   Procedure Laterality Date    DENTAL SURGERY         No family history on file.      Medications have been verified.        Objective   /82   Pulse 72   Temp 97.7 °F (36.5 °C)   Resp 16   SpO2 100%        Physical Exam     Physical Exam  Vitals reviewed.   Constitutional:       General: She is not in acute distress.     Appearance: Normal appearance.   HENT:      Right Ear: Tympanic membrane, ear canal and external ear normal.      Left Ear: Tympanic membrane, ear canal and external ear normal.      Nose: Nose normal.      Mouth/Throat:      Mouth: Mucous membranes are moist.      Pharynx: Posterior oropharyngeal erythema present.      Tonsils: No tonsillar exudate. 1+ on the right. 1+ on the left.   Eyes:      Conjunctiva/sclera: Conjunctivae normal.   Cardiovascular:      Rate and Rhythm: Normal rate and regular rhythm.      Pulses: Normal pulses.      Heart sounds: Normal  heart sounds. No murmur heard.  Pulmonary:      Effort: Pulmonary effort is normal. No respiratory distress.      Breath sounds: Normal breath sounds.   Lymphadenopathy:      Cervical: Cervical adenopathy (left sided) present.   Skin:     General: Skin is warm and dry.   Neurological:      General: No focal deficit present.      Mental Status: She is alert and oriented to person, place, and time.   Psychiatric:         Mood and Affect: Mood normal.         Behavior: Behavior normal.

## 2024-05-17 ENCOUNTER — EVALUATION (OUTPATIENT)
Dept: PHYSICAL THERAPY | Age: 34
End: 2024-05-17
Payer: COMMERCIAL

## 2024-05-17 DIAGNOSIS — M25.851 FEMOROACETABULAR IMPINGEMENT OF RIGHT HIP: ICD-10-CM

## 2024-05-17 DIAGNOSIS — M51.26 PROTRUSION OF LUMBAR INTERVERTEBRAL DISC: Primary | ICD-10-CM

## 2024-05-17 DIAGNOSIS — M48.00 CENTRAL SPINAL STENOSIS: ICD-10-CM

## 2024-05-17 DIAGNOSIS — M51.36 BULGING LUMBAR DISC: ICD-10-CM

## 2024-05-17 DIAGNOSIS — M47.816 LUMBAR FACET ARTHROPATHY: ICD-10-CM

## 2024-05-17 PROCEDURE — 97110 THERAPEUTIC EXERCISES: CPT

## 2024-05-17 PROCEDURE — 97161 PT EVAL LOW COMPLEX 20 MIN: CPT

## 2024-05-17 NOTE — PROGRESS NOTES
PT Evaluation     Today's date: 2024  Patient name: Chyna Pal  : 1990  MRN: [id number]  Referring provider: Jason Moser*  Dx:   Encounter Diagnosis     ICD-10-CM    1. Protrusion of lumbar intervertebral disc  M51.26       2. Lumbar facet arthropathy  M47.816       3. Central spinal stenosis  M48.00       4. Bulging lumbar disc  M51.36       5. Femoroacetabular impingement of right hip  M25.851           Start Time: 1505  Stop Time: 1605  Total time in clinic (min): 60 minutes    Assessment  Impairments: abnormal coordination, abnormal gait, abnormal muscle firing, abnormal muscle tone, abnormal or restricted ROM, activity intolerance, impaired balance, impaired physical strength, lacks appropriate home exercise program, pain with function, weight-bearing intolerance, poor posture  and activity limitations    Assessment details: Patient is a 34 y.o. female presenting to initial examination with chief complaint of LBP and R hip pain. Signs and symptoms are consistent with referring diagnoses as noted. Primary impairments include weakness of R hip flexors/abductors, core musculature, postural dysfunction. 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 Patient achieved FOTO score of 47. Educated patient regarding plan of care and answered all patient questions to patient satisfaction. Patient would benefit from skilled PT interventions to address above impairments in order to maximize functional capacity. Thank you for the referral.    Also note that pt will benefit from a PT pelvic floor evaluation due to pt report of bladder urgency (has to go often, at least 1x/every hour and will have leakage at times as well, noting this has been happening since her youngest child (age 7) was born.      Understanding of Dx/Px/POC: good     Prognosis: good    Goals  Impairment Goals: 4-6 weeks  - Patient to decrease pain by 1-4 levels to 0-2/10 at worst for improved activity tolerance.  - Patient to have improved core strength by 1/2 - 1 grade to at least 4/5 t/o for improved lumbar support/stability.   - Patient to have improved R hip flexor/abductor strength to WFL t/o for improved stability.     Functional Goals: by discharge  - Patient to discharge to independent HEP  - Patient to increase FOTO to at least 61 for improved overall functional mobility.   - Patient to have less sleep disturbances for improved sleep quality.   - Patient to have improved postural awareness to good.   - Patient to have no difficulty with prolonged sitting, standing, walking for ease with ADLs/work duties.     Plan  Patient would benefit from: skilled physical therapy  Referral necessary: Yes  Planned modality interventions: cryotherapy and thermotherapy: hydrocollator packs    Planned therapy interventions: joint mobilization, manual therapy, massage, Bourne taping, motor coordination training, neuromuscular re-education, orthotic fitting/training, patient education, postural training, strengthening, stretching, therapeutic activities, therapeutic exercise, therapeutic training, flexibility, functional ROM exercises, gait training, home exercise program, body mechanics training, behavior modification, balance/weight bearing training, balance, activity modification and abdominal trunk stabilization    Frequency: 2x week  Duration in visits: 12  Duration in weeks: 12  Plan of Care beginning date: 5/17/2024  Plan of Care expiration date: 8/9/2024  Treatment plan discussed with: patient        Subjective Evaluation    History of Present Illness  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   Hobbies: knitting, reading, used to be a runner     Pt presents to PT with chief  "complaint of LBP and L hip pain. Pt reports she has been dealing with her LBP on/off since high school progressively getting worse especially this past year. Pt was an athlete in high school - pain started more so in the sacral region progressing to lumbar region. Also in high school, pt's R hip would lock up and she would have to lift and shift it to \"put back into place.\" Pt did try chiropractic care everytime it got bad to the point of self management, on/off over the past several years.       Xrays/MRI-  R hip: Findings suggestive of cam type femoral acetabular impingement of the right hip as above including a small synovial herniation pit at the anterior right femoral head and neck junction. No significant chondrosis or discrete labral tear however is   identified.    Of lumbar spine:  FINDINGS:     VERTEBRAL BODIES:  There are 5 lumbar type vertebral bodies. There is mild dextroscoliosis with apex at L4-5. There is L4 vertebral body hemangioma. Small superior endplate Schmorl's nodes at levels L4 and L5.     SACRUM:  Normal signal within the sacrum. No evidence of insufficiency or stress fracture.     DISTAL CORD AND CONUS:  Normal size and signal within the distal cord and conus.     PARASPINAL SOFT TISSUES:  Paraspinal soft tissues are unremarkable.     LOWER THORACIC DISC SPACES:  Normal disc height and signal.  No disc herniation, canal stenosis or foraminal narrowing.     LUMBAR DISC SPACES: Mild disc height loss with degenerative disc dehydration at levels L3-S1     L1-L2: No disc bulge. No canal or foraminal stenosis.     L2-3: Mild disc bulge. Mild facet arthropathy. Minor canal narrowing. No foraminal stenosis.     L3-4: Disc bulge with small right central annular fissure. Mild facet arthropathy. Mild canal stenosis. No significant foraminal narrowing.     L4-5: Mild disc bulge. Mild facet arthropathy. Mild canal narrowing. Mild right foraminal narrowing.     L5-S1: Central disc protrusion with " annular fissure. Mild facet arthropathy. Mild canal narrowing. No foraminal stenosis.        OTHER FINDINGS:  None.     IMPRESSION:     Mild noncompressive lumbar degenerative change as detailed.    Pt reports this is the first she is getting PT treatment for this condition. Will have f/u in about 6 weeks to assess progress from PT.     Limitations: prolonged standing, walking; cooking ADLs (especially dinner), lifting, steps are challenging when in pain; household chores, prolonged sitting, (+) sleep disturbances (cannot sleep on R side due to R hip; hard time sleeping on back; preferred position is L S/L)    Feels best in the mornings. Gets progressively worse as the day goes on.     Pt reports of numbness/tingling down BLE's R >L but unsure of intensity/duration/frequency - instructed pt to monitor this. Pt also reports of having a burning sensation in her lower back  R >L usually when her pain gets intense.      Pt denies saddle paraesthesias.     Pt does note having bladder urgency - likely pelvic floor related as pt notes this has been ongoing since she had her last child 7 years ago. Pt notes she is open to getting a pelvic floor evaluation done by pelvic therapist.     Is taking an anti inflammatory which is helping. Also     Patient Goals  Patient goals for therapy: increased strength, decreased pain, improved balance, increased motion, independence with ADLs/IADLs and return to sport/leisure activities    Pain  Current pain rating: 3  At best pain rating: 3  At worst pain ratin  Quality: dull ache, sharp and throbbing  Relieving factors: heat and medications (TENS unit)  Aggravating factors: stair climbing, walking, standing and sitting    Social Support  Steps to enter house: yes  Stairs in house: yes   Lives in: multiple-level home  Lives with: spouse and young children    Employment status: working    Diagnostic Tests  X-ray: abnormal (see above for results)  MRI studies: abnormal (see above for  results)  Treatments  Current treatment: physical therapy        Objective     Concurrent Complaints  Positive for night pain, disturbed sleep and bladder dysfunction (possibly pelvic floor related (see history of present illness)). Negative for bowel dysfunction, saddle (S4) numbness, cardiac problem, kidney problem, gallbladder problem and stomach problem    Palpation     Right   Tenderness of the erector spinae, lumbar paraspinals and quadratus lumborum.     Tenderness     Right Hip   Tenderness in the PSIS.     Active Range of Motion     Lumbar   Flexion:  WFL  Extension:  with pain Restriction level: minimal  Left lateral flexion:  WFL  Right lateral flexion:  WFL  Left rotation:  WFL  Right rotation:  WFL  Left Hip   Normal active range of motion    Right Hip   Internal rotation (90/90): 5 degrees with pain    Additional Active Range of Motion Details  Significantly limited R hip IR as compared to unaffected L hip IR.     Joint Play     Pain: L1, L2, L3, L4, L5 and S1     Strength/Myotome Testing     Left Hip   Planes of Motion   Flexion: 4+  Abduction: 4+  Adduction: 4+    Right Hip   Planes of Motion   Flexion: 4  Abduction: 4  Adduction: 4+    Left Knee   Flexion: 4+  Extension: 4+    Right Knee   Flexion: 4+  Extension: 4+    Left Ankle/Foot   Dorsiflexion: 4+  Plantar flexion: 4+    Right Ankle/Foot   Dorsiflexion: 4+  Plantar flexion: 4+    Tests     Lumbar     Left   Negative crossed SLR and passive SLR.     Right   Positive passive SLR.   Negative crossed SLR.     Left Pelvic Girdle/Sacrum   Negative: active SLR test.     Right Pelvic Girdle/Sacrum   Negative: active SLR test.     Left Hip   Negative JUANPABLO.     Right Hip   Negative JUANPABLO.     General Comments:      Lumbar Comments  Repeated standing lumbar extension (10 reps) - increased pain slightly     Held off repeated lumbar flexion due to central disc protrusion with annulus fissure in L5-S1.     Attempted PAKO - symptoms peripheralized into R  buttock     Core musculature strength grossly 3+/5 t/o.           Flowsheet Rows      Flowsheet Row Most Recent Value   PT/OT G-Codes    Current Score 47   Projected Score 61                   POC expires Unit limit Auth Expiration date PT/OT + Visit Limit?   8/9/24 4 na BOMN             Visit/Unit Tracking  AUTH Status:  Date 5/17        No auth required Used 1         Remaining                  Precautions: L5-S1 central disc protrusion with annular fissure, R hip impingement syndrome      Manuals 5/17                                                                APT ex's:             Focus on core stabilization, lumbar traction, hip strengthening                                                                                           Ther Ex                                                                                                                     Ther Activity             HEP instruction Issued core stabilizatione x's                          Gait Training                                       Modalities                                         Access Code: [email or web address]  URL: [email or web address]  Date: 05/17/2024  Prepared by: Deepa Rodas    Exercises  - Supine Transversus Abdominis Bracing - Hands on Stomach  - 1 x daily - 7 x weekly - 1-2 sets - 10 reps  - Supine Posterior Pelvic Tilt  - 1 x daily - 7 x weekly - 1-2 sets - 10 reps  - Posterior Pelvic Tilt with Adduction Using Pillow in Hooklying  - 1 x daily - 7 x weekly - 1-2 sets - 10 reps  - Supine Pelvic Floor Contraction  - 1 x daily - 7 x weekly - 1-2 sets - 10 reps  - Supine Transversus Abdominis Bracing with Pelvic Floor Contraction  - 1 x daily - 7 x weekly - 1-2 sets - 10 reps

## 2024-05-20 ENCOUNTER — OFFICE VISIT (OUTPATIENT)
Dept: PHYSICAL THERAPY | Age: 34
End: 2024-05-20
Payer: COMMERCIAL

## 2024-05-20 DIAGNOSIS — M51.36 BULGING LUMBAR DISC: ICD-10-CM

## 2024-05-20 DIAGNOSIS — M51.26 PROTRUSION OF LUMBAR INTERVERTEBRAL DISC: Primary | ICD-10-CM

## 2024-05-20 DIAGNOSIS — M47.816 LUMBAR FACET ARTHROPATHY: ICD-10-CM

## 2024-05-20 DIAGNOSIS — M48.00 CENTRAL SPINAL STENOSIS: ICD-10-CM

## 2024-05-20 DIAGNOSIS — M25.851 FEMOROACETABULAR IMPINGEMENT OF RIGHT HIP: ICD-10-CM

## 2024-05-20 PROCEDURE — 97113 AQUATIC THERAPY/EXERCISES: CPT

## 2024-05-20 NOTE — PROGRESS NOTES
Daily Note     Today's date: 2024  Patient name: Chyna Pal  : 1990  MRN: [id number]  Referring provider: Jason Moser*  Dx:   Encounter Diagnosis     ICD-10-CM    1. Protrusion of lumbar intervertebral disc  M51.26       2. Femoroacetabular impingement of right hip  M25.851       3. Lumbar facet arthropathy  M47.816       4. Central spinal stenosis  M48.00       5. Bulging lumbar disc  M51.36           Start Time: 1700  Stop Time: 1800  Total time in clinic (min): 60 minutes    Subjective: pt notes no new complaints w/ initial aquatic session. Some hip pain w/ DW biking.       Objective: See treatment diary below      Assessment: Tolerated treatment fairly well. Gentle pool session to initiate aquatic PT. Pool rules were given to pt before entering the water. Gentle ex's for LB/LE's and hip today. Patient would benefit from continued PT      Plan: Continue per plan of care.      POC expires Unit limit Auth Expiration date PT/OT + Visit Limit?   24 4 na BOMN             Visit/Unit Tracking  AUTH Status:  Date        No auth required Used 1 2        Remaining            Precautions:     Daily Treatment Diary     Manual                                                                                   Exercise Diary              Water walking 10            Postural training             Gait training             Home exercise pgm/patient education 8'            Wall: t/h raises 1            Hip abd/add 2            Marching 1            squats 1            Knee flex/ext 1            Hip flex/ext swing 2            SLS (eyes open/closed)             SLS w UE mvmt  AROM/ball toss             Weight shifting             UE Noodle work x 4  4'            UE AROM             Resistive UE work (paddles, bells, TB)             Core work on noodle (sitting/stdg)             Sit on noodle with movement             Seated on pool bench w proper posture             Ankle df/pf              marching             Hip Ab/add             Knee flex/ext             Deep water mvmt 3            Deep water tx/stretching 10            Specific self - stretches wall/steps 2                Modalities  5/20            whirlpool 10                    Precautions: L5-S1 central disc protrusion with annular fissure, R hip impingement syndrome      Manuals 5/17                                                                APT ex's:             Focus on core stabilization, lumbar traction, hip strengthening                                                                                           Ther Ex                                                                                                                     Ther Activity             HEP instruction Issued core stabilizatione x's                          Gait Training                                       Modalities                                         Access Code: [email or web address]  URL: [email or web address]  Date: 05/17/2024  Prepared by: Deepa Rodas    Exercises  - Supine Transversus Abdominis Bracing - Hands on Stomach  - 1 x daily - 7 x weekly - 1-2 sets - 10 reps  - Supine Posterior Pelvic Tilt  - 1 x daily - 7 x weekly - 1-2 sets - 10 reps  - Posterior Pelvic Tilt with Adduction Using Pillow in Hooklying  - 1 x daily - 7 x weekly - 1-2 sets - 10 reps  - Supine Pelvic Floor Contraction  - 1 x daily - 7 x weekly - 1-2 sets - 10 reps  - Supine Transversus Abdominis Bracing with Pelvic Floor Contraction  - 1 x daily - 7 x weekly - 1-2 sets - 10 reps

## 2024-05-24 ENCOUNTER — APPOINTMENT (OUTPATIENT)
Dept: PHYSICAL THERAPY | Age: 34
End: 2024-05-24
Payer: COMMERCIAL

## 2024-05-30 ENCOUNTER — APPOINTMENT (OUTPATIENT)
Dept: PHYSICAL THERAPY | Age: 34
End: 2024-05-30
Payer: COMMERCIAL

## 2024-11-04 ENCOUNTER — OFFICE VISIT (OUTPATIENT)
Dept: URGENT CARE | Facility: CLINIC | Age: 34
End: 2024-11-04
Payer: COMMERCIAL

## 2024-11-04 VITALS
DIASTOLIC BLOOD PRESSURE: 83 MMHG | TEMPERATURE: 97.3 F | RESPIRATION RATE: 18 BRPM | SYSTOLIC BLOOD PRESSURE: 120 MMHG | HEART RATE: 100 BPM | OXYGEN SATURATION: 97 %

## 2024-11-04 DIAGNOSIS — J20.9 ACUTE BRONCHITIS, UNSPECIFIED ORGANISM: Primary | ICD-10-CM

## 2024-11-04 DIAGNOSIS — J02.9 SORE THROAT: ICD-10-CM

## 2024-11-04 PROCEDURE — 99213 OFFICE O/P EST LOW 20 MIN: CPT

## 2024-11-04 RX ORDER — ALBUTEROL SULFATE 90 UG/1
2 INHALANT RESPIRATORY (INHALATION) EVERY 6 HOURS PRN
Qty: 8.5 G | Refills: 0 | Status: SHIPPED | OUTPATIENT
Start: 2024-11-04

## 2024-11-04 RX ORDER — DOXYCYCLINE 100 MG/1
100 CAPSULE ORAL EVERY 12 HOURS SCHEDULED
Qty: 14 CAPSULE | Refills: 0 | Status: SHIPPED | OUTPATIENT
Start: 2024-11-04 | End: 2024-11-11

## 2024-11-04 RX ORDER — PREDNISONE 20 MG/1
40 TABLET ORAL DAILY
Qty: 10 TABLET | Refills: 0 | Status: SHIPPED | OUTPATIENT
Start: 2024-11-04 | End: 2024-11-09

## 2024-11-04 NOTE — PROGRESS NOTES
[facility]        NAME: Chyna Pal is a 34 y.o. female  : 1990    MRN: [id number]  DATE: 2024  TIME: 9:28 AM    Assessment and Plan   Acute bronchitis, unspecified organism [J20.9]  1. Acute bronchitis, unspecified organism  doxycycline hyclate (VIBRAMYCIN) 100 mg capsule    predniSONE 20 mg tablet    albuterol (ProAir HFA) 90 mcg/act inhaler      2. Sore throat          Rapid Strep negative. Lung CTABL - will defer chest x-ray for now. Will treat with Doxycycline for presumed bacterial bronchitis and cover for CAP due to exposure. Prednisone and inhaler as directed for additional symptom relief. VSS in clinic, appears in no acute distress. Educated on use of OTC products for symptoms. Advised close follow-up with PCP or to report to the ER if symptoms worsen. Patient verbalizes understanding and agreeable to plan.         Patient Instructions     Take antibiotic as directed for next week and steroid as directed for next 5 days. Complete entire course of therapy even if symptoms improve and take medication with food to avoid upset stomach. Use inhaler as directed as needed. Do not take ibuprofen while taking steroid. Continue over-the-counter products for other symptoms: tylenol for fevers/pain/body aches, sudafed and flonase (fluticasone) with nasal saline for congestion, mucinex for cough, and airborne/emergen-c for vitamin supplementation. Follow-up with PCP in 3-5 days if no improvement of symptoms. Report to the ER if symptoms worsen.        Chief Complaint     Chief Complaint   Patient presents with    Cough     Pt with cough and congestion x8 days. Throat irritation from coughing. Feels SOB at times. Has hx of asthma. Had fever first 3 days of symptoms. Chest tightness. Taking robitussin and OTC cold/flu medicine.  recently had pneumonia.         History of Present Illness       34 year old female presents for evaluation of cough and congestion x 8 days. She relates  her  at home has similar symptoms and was recently diagnosed with pneumonia. She reports h/o asthma but has not used her inhalers in years. She relates she originally had fevers but denies any recent fevers within the past 5 days. 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 She reports occasionally productive sputum that is worse at night when lying down and intermittent chest tightness with coughing. She denies SOB. She's been taking robitussin and advil cold/sinus for symptoms with minimal improvement.    Cough  This is a recurrent problem. The current episode started 1 to 4 weeks ago. The problem has been unchanged. The problem occurs every few minutes. The cough is Productive of sputum. Associated symptoms include a fever, nasal congestion, postnasal drip and rhinorrhea. Pertinent negatives include no chest pain, chills, ear congestion, ear pain, headaches, heartburn, hemoptysis, myalgias, rash, sore throat, shortness of breath, sweats, weight loss or wheezing. The symptoms are aggravated by lying down. She has tried OTC cough suppressant for the symptoms. The treatment provided mild relief. Her past medical history is significant for asthma. There is no history of environmental allergies.       Review of Systems   Review of Systems   Constitutional:  Positive for activity change and fever. Negative for appetite change, chills, fatigue and weight loss.   HENT:  Positive for congestion, postnasal drip and rhinorrhea. Negative for ear pain, sinus pressure, sinus pain, sneezing, sore throat and trouble swallowing.    Eyes:  Negative for visual disturbance.   Respiratory:  Positive for cough. Negative for hemoptysis, chest tightness, shortness of breath and wheezing.    Cardiovascular:  Negative for chest pain and palpitations.   Gastrointestinal:  Negative for abdominal pain, constipation, diarrhea, heartburn, nausea  and vomiting.   Musculoskeletal:  Negative for arthralgias, back pain, myalgias and neck pain.   Skin:  Negative for color change, pallor and rash.   Allergic/Immunologic: Negative for environmental allergies and food allergies.   Neurological:  Negative for dizziness, light-headedness and headaches.         Current Medications       Current Outpatient Medications:     albuterol (ProAir HFA) 90 mcg/act inhaler, Inhale 2 puffs every 6 (six) hours as needed for wheezing, Disp: 8.5 g, Rfl: 0    ALPRAZolam (Xanax) 0.5 mg tablet, , Disp: , Rfl:     doxycycline hyclate (VIBRAMYCIN) 100 mg capsule, Take 1 capsule (100 mg total) by mouth every 12 (twelve) hours for 7 days, Disp: 14 capsule, Rfl: 0    escitalopram (Lexapro) 10 mg tablet, , Disp: , Rfl:     gabapentin (NEURONTIN) 100 mg capsule, take 2 capsules by mouth every morning and 2 capsules by mouth ev...  (REFER TO PRESCRIPTION NOTES)., Disp: , Rfl:     predniSONE 20 mg tablet, Take 2 tablets (40 mg total) by mouth daily for 5 days, Disp: 10 tablet, Rfl: 0    propranolol (INDERAL) 10 mg tablet, Take 10 mg by mouth 3 (three) times a day, Disp: , Rfl:     meloxicam (Mobic) 15 mg tablet, Take 1 tablet (15 mg total) by mouth daily (Patient not taking: Reported on 11/4/2024), Disp: 30 tablet, Rfl: 1    Current Allergies     Allergies as of 11/04/2024 - Reviewed 11/04/2024   Allergen Reaction Noted    Benadryl [diphenhydramine] Hives 04/02/2018    Penicillins Hives 04/02/2018    Topiramate Anxiety 10/27/2022    Zithromax [azithromycin] Palpitations 04/02/2018            The following portions of the patient's history were reviewed and updated as appropriate: allergies, current medications, past family history, past medical history, past social history, past surgical history and problem list.     Past Medical History:   Diagnosis Date    Asthma        Past Surgical History:   Procedure Laterality Date    DENTAL SURGERY         No family history on file.      Medications  have been verified.        Objective   /83   Pulse 100   Temp (!) 97.3 °F (36.3 °C)   Resp 18   SpO2 97%        Physical Exam     Physical Exam  Vitals and nursing note reviewed.   Constitutional:       General: She is awake. She is not in acute distress.     Appearance: Normal appearance. She is well-developed and normal weight.   HENT:      Head: Normocephalic and atraumatic.      Right Ear: Hearing, tympanic membrane, ear canal and external ear normal.      Left Ear: Hearing, tympanic membrane, ear canal and external ear normal.      Nose: Congestion and rhinorrhea present. Rhinorrhea is clear.      Right Turbinates: Not enlarged, swollen or pale.      Left Turbinates: Not enlarged, swollen or pale.      Right Sinus: No maxillary sinus tenderness or frontal sinus tenderness.      Left Sinus: No maxillary sinus tenderness or frontal sinus tenderness.      Mouth/Throat:      Lips: Pink.      Mouth: Mucous membranes are moist.      Pharynx: Oropharynx is clear. Uvula midline. Posterior oropharyngeal erythema and postnasal drip present. No oropharyngeal exudate.      Tonsils: No tonsillar exudate or tonsillar abscesses.   Eyes:      Conjunctiva/sclera: Conjunctivae normal.   Cardiovascular:      Rate and Rhythm: Normal rate and regular rhythm.      Pulses: Normal pulses.      Heart sounds: Normal heart sounds.   Pulmonary:      Effort: Pulmonary effort is normal.      Breath sounds: Normal breath sounds.   Musculoskeletal:      Cervical back: Full passive range of motion without pain, normal range of motion and neck supple.   Lymphadenopathy:      Cervical: No cervical adenopathy.   Neurological:      General: No focal deficit present.      Mental Status: She is alert and oriented to person, place, and time.   Psychiatric:         Mood and Affect: Mood normal.         Behavior: Behavior normal. Behavior is cooperative.         Thought Content: Thought content normal.         Judgment: Judgment normal.